# Patient Record
Sex: MALE | Race: OTHER
[De-identification: names, ages, dates, MRNs, and addresses within clinical notes are randomized per-mention and may not be internally consistent; named-entity substitution may affect disease eponyms.]

---

## 2017-04-03 ENCOUNTER — HOSPITAL ENCOUNTER (OUTPATIENT)
Dept: HOSPITAL 8 - OUT | Age: 57
Discharge: HOME | End: 2017-04-03
Attending: SURGERY
Payer: COMMERCIAL

## 2017-04-03 VITALS — SYSTOLIC BLOOD PRESSURE: 152 MMHG | DIASTOLIC BLOOD PRESSURE: 83 MMHG

## 2017-04-03 VITALS — BODY MASS INDEX: 34.41 KG/M2 | HEIGHT: 68 IN | WEIGHT: 227.08 LBS

## 2017-04-03 DIAGNOSIS — I12.0: ICD-10-CM

## 2017-04-03 DIAGNOSIS — N18.6: ICD-10-CM

## 2017-04-03 DIAGNOSIS — E11.22: Primary | ICD-10-CM

## 2017-04-03 DIAGNOSIS — Z99.2: ICD-10-CM

## 2017-04-03 PROCEDURE — 82962 GLUCOSE BLOOD TEST: CPT

## 2017-04-03 PROCEDURE — 85730 THROMBOPLASTIN TIME PARTIAL: CPT

## 2017-04-03 PROCEDURE — 93005 ELECTROCARDIOGRAM TRACING: CPT

## 2017-04-03 PROCEDURE — 36821 AV FUSION DIRECT ANY SITE: CPT

## 2017-04-03 PROCEDURE — 36415 COLL VENOUS BLD VENIPUNCTURE: CPT

## 2017-04-03 PROCEDURE — 85610 PROTHROMBIN TIME: CPT

## 2017-04-03 PROCEDURE — 80047 BASIC METABLC PNL IONIZED CA: CPT

## 2018-01-18 ENCOUNTER — HOSPITAL ENCOUNTER (OUTPATIENT)
Dept: HOSPITAL 8 - OUT | Age: 58
Discharge: HOME | End: 2018-01-18
Attending: INTERNAL MEDICINE
Payer: COMMERCIAL

## 2018-01-18 VITALS — HEIGHT: 68 IN | BODY MASS INDEX: 33.75 KG/M2 | WEIGHT: 222.67 LBS

## 2018-01-18 VITALS — DIASTOLIC BLOOD PRESSURE: 69 MMHG | SYSTOLIC BLOOD PRESSURE: 103 MMHG

## 2018-01-18 DIAGNOSIS — E11.22: ICD-10-CM

## 2018-01-18 DIAGNOSIS — N18.6: ICD-10-CM

## 2018-01-18 DIAGNOSIS — D12.5: ICD-10-CM

## 2018-01-18 DIAGNOSIS — Z12.11: Primary | ICD-10-CM

## 2018-01-18 DIAGNOSIS — I12.0: ICD-10-CM

## 2018-01-18 DIAGNOSIS — D12.4: ICD-10-CM

## 2018-01-18 DIAGNOSIS — Z87.891: ICD-10-CM

## 2018-01-18 DIAGNOSIS — Z99.2: ICD-10-CM

## 2018-01-18 LAB
ALBUMIN SERPL-MCNC: 3.9 G/DL (ref 3.4–5)
ALP SERPL-CCNC: 64 U/L (ref 45–117)
ALT SERPL-CCNC: 17 U/L (ref 12–78)
ANION GAP SERPL CALC-SCNC: 12 MMOL/L (ref 5–15)
BASOPHILS # BLD AUTO: 0.05 X10^3/UL (ref 0–0.1)
BASOPHILS NFR BLD AUTO: 1 % (ref 0–1)
BILIRUB SERPL-MCNC: 0.6 MG/DL (ref 0.2–1)
CALCIUM SERPL-MCNC: 9 MG/DL (ref 8.5–10.1)
CHLORIDE SERPL-SCNC: 96 MMOL/L (ref 98–107)
CREAT SERPL-MCNC: 6.15 MG/DL (ref 0.7–1.3)
EOSINOPHIL # BLD AUTO: 0.52 X10^3/UL (ref 0–0.4)
EOSINOPHIL NFR BLD AUTO: 8 % (ref 1–7)
ERYTHROCYTE [DISTWIDTH] IN BLOOD BY AUTOMATED COUNT: 13.4 % (ref 9.4–14.8)
LYMPHOCYTES # BLD AUTO: 1.17 X10^3/UL (ref 1–3.4)
LYMPHOCYTES NFR BLD AUTO: 17 % (ref 22–44)
MCH RBC QN AUTO: 31.1 PG (ref 27.5–34.5)
MCHC RBC AUTO-ENTMCNC: 34 G/DL (ref 33.2–36.2)
MCV RBC AUTO: 91.7 FL (ref 81–97)
MD: NO
MONOCYTES # BLD AUTO: 0.75 X10^3/UL (ref 0.2–0.8)
MONOCYTES NFR BLD AUTO: 11 % (ref 2–9)
NEUTROPHILS # BLD AUTO: 4.29 X10^3/UL (ref 1.8–6.8)
NEUTROPHILS NFR BLD AUTO: 63 % (ref 42–75)
PLATELET # BLD AUTO: 262 X10^3/UL (ref 130–400)
PMV BLD AUTO: 6.6 FL (ref 7.4–10.4)
PROT SERPL-MCNC: 8 G/DL (ref 6.4–8.2)
RBC # BLD AUTO: 3.75 X10^6/UL (ref 4.38–5.82)

## 2018-01-18 PROCEDURE — 82962 GLUCOSE BLOOD TEST: CPT

## 2018-01-18 PROCEDURE — 93005 ELECTROCARDIOGRAM TRACING: CPT

## 2018-01-18 PROCEDURE — 80053 COMPREHEN METABOLIC PANEL: CPT

## 2018-01-18 PROCEDURE — 88305 TISSUE EXAM BY PATHOLOGIST: CPT

## 2018-01-18 PROCEDURE — 85025 COMPLETE CBC W/AUTO DIFF WBC: CPT

## 2018-01-18 PROCEDURE — 45385 COLONOSCOPY W/LESION REMOVAL: CPT

## 2018-01-18 PROCEDURE — 36415 COLL VENOUS BLD VENIPUNCTURE: CPT

## 2020-07-10 ENCOUNTER — HOSPITAL ENCOUNTER (OUTPATIENT)
Dept: RADIOLOGY | Facility: MEDICAL CENTER | Age: 60
End: 2020-07-10
Attending: NURSE PRACTITIONER
Payer: COMMERCIAL

## 2020-07-10 DIAGNOSIS — N18.6 END STAGE KIDNEY DISEASE (HCC): ICD-10-CM

## 2020-07-10 PROCEDURE — 76775 US EXAM ABDO BACK WALL LIM: CPT

## 2022-06-26 ENCOUNTER — APPOINTMENT (OUTPATIENT)
Dept: RADIOLOGY | Facility: MEDICAL CENTER | Age: 62
End: 2022-06-26
Attending: EMERGENCY MEDICINE
Payer: MEDICARE

## 2022-06-26 ENCOUNTER — HOSPITAL ENCOUNTER (EMERGENCY)
Facility: MEDICAL CENTER | Age: 62
End: 2022-06-26
Attending: EMERGENCY MEDICINE
Payer: MEDICARE

## 2022-06-26 VITALS
RESPIRATION RATE: 17 BRPM | OXYGEN SATURATION: 93 % | HEIGHT: 68 IN | WEIGHT: 240 LBS | HEART RATE: 75 BPM | DIASTOLIC BLOOD PRESSURE: 78 MMHG | BODY MASS INDEX: 36.37 KG/M2 | SYSTOLIC BLOOD PRESSURE: 156 MMHG | TEMPERATURE: 97.2 F

## 2022-06-26 DIAGNOSIS — S00.01XA ABRASION OF SCALP, INITIAL ENCOUNTER: ICD-10-CM

## 2022-06-26 DIAGNOSIS — R55 SYNCOPE, UNSPECIFIED SYNCOPE TYPE: ICD-10-CM

## 2022-06-26 DIAGNOSIS — R79.89 ELEVATED TROPONIN: ICD-10-CM

## 2022-06-26 DIAGNOSIS — J18.9 PNEUMONIA DUE TO INFECTIOUS ORGANISM, UNSPECIFIED LATERALITY, UNSPECIFIED PART OF LUNG: ICD-10-CM

## 2022-06-26 DIAGNOSIS — E87.5 HYPERKALEMIA: ICD-10-CM

## 2022-06-26 LAB
ALBUMIN SERPL BCP-MCNC: 4.6 G/DL (ref 3.2–4.9)
ALBUMIN/GLOB SERPL: 1.5 G/DL
ALP SERPL-CCNC: 70 U/L (ref 30–99)
ALT SERPL-CCNC: 6 U/L (ref 2–50)
ANION GAP SERPL CALC-SCNC: 23 MMOL/L (ref 7–16)
AST SERPL-CCNC: <5 U/L (ref 12–45)
BASOPHILS # BLD AUTO: 0.4 % (ref 0–1.8)
BASOPHILS # BLD: 0.04 K/UL (ref 0–0.12)
BILIRUB SERPL-MCNC: 0.3 MG/DL (ref 0.1–1.5)
BUN SERPL-MCNC: 73 MG/DL (ref 8–22)
CALCIUM SERPL-MCNC: 8.6 MG/DL (ref 8.5–10.5)
CHLORIDE SERPL-SCNC: 95 MMOL/L (ref 96–112)
CO2 SERPL-SCNC: 22 MMOL/L (ref 20–33)
CREAT SERPL-MCNC: 10.46 MG/DL (ref 0.5–1.4)
EKG IMPRESSION: NORMAL
EOSINOPHIL # BLD AUTO: 0.88 K/UL (ref 0–0.51)
EOSINOPHIL NFR BLD: 7.8 % (ref 0–6.9)
ERYTHROCYTE [DISTWIDTH] IN BLOOD BY AUTOMATED COUNT: 52.7 FL (ref 35.9–50)
GFR SERPLBLD CREATININE-BSD FMLA CKD-EPI: 5 ML/MIN/1.73 M 2
GLOBULIN SER CALC-MCNC: 3.1 G/DL (ref 1.9–3.5)
GLUCOSE SERPL-MCNC: 102 MG/DL (ref 65–99)
HCT VFR BLD AUTO: 32.4 % (ref 42–52)
HGB BLD-MCNC: 10.3 G/DL (ref 14–18)
IMM GRANULOCYTES # BLD AUTO: 0.1 K/UL (ref 0–0.11)
IMM GRANULOCYTES NFR BLD AUTO: 0.9 % (ref 0–0.9)
LYMPHOCYTES # BLD AUTO: 1.11 K/UL (ref 1–4.8)
LYMPHOCYTES NFR BLD: 9.8 % (ref 22–41)
MCH RBC QN AUTO: 30.7 PG (ref 27–33)
MCHC RBC AUTO-ENTMCNC: 31.8 G/DL (ref 33.7–35.3)
MCV RBC AUTO: 96.7 FL (ref 81.4–97.8)
MONOCYTES # BLD AUTO: 0.84 K/UL (ref 0–0.85)
MONOCYTES NFR BLD AUTO: 7.4 % (ref 0–13.4)
NEUTROPHILS # BLD AUTO: 8.37 K/UL (ref 1.82–7.42)
NEUTROPHILS NFR BLD: 73.7 % (ref 44–72)
NRBC # BLD AUTO: 0 K/UL
NRBC BLD-RTO: 0 /100 WBC
PLATELET # BLD AUTO: 235 K/UL (ref 164–446)
PMV BLD AUTO: 8.7 FL (ref 9–12.9)
POTASSIUM SERPL-SCNC: 5.9 MMOL/L (ref 3.6–5.5)
PROCALCITONIN SERPL-MCNC: 0.54 NG/ML
PROT SERPL-MCNC: 7.7 G/DL (ref 6–8.2)
RBC # BLD AUTO: 3.35 M/UL (ref 4.7–6.1)
SODIUM SERPL-SCNC: 140 MMOL/L (ref 135–145)
TROPONIN T SERPL-MCNC: 94 NG/L (ref 6–19)
WBC # BLD AUTO: 11.3 K/UL (ref 4.8–10.8)

## 2022-06-26 PROCEDURE — 71045 X-RAY EXAM CHEST 1 VIEW: CPT

## 2022-06-26 PROCEDURE — 87040 BLOOD CULTURE FOR BACTERIA: CPT

## 2022-06-26 PROCEDURE — 99285 EMERGENCY DEPT VISIT HI MDM: CPT

## 2022-06-26 PROCEDURE — 70450 CT HEAD/BRAIN W/O DYE: CPT | Mod: MG

## 2022-06-26 PROCEDURE — 36415 COLL VENOUS BLD VENIPUNCTURE: CPT

## 2022-06-26 PROCEDURE — 700102 HCHG RX REV CODE 250 W/ 637 OVERRIDE(OP): Performed by: EMERGENCY MEDICINE

## 2022-06-26 PROCEDURE — A9270 NON-COVERED ITEM OR SERVICE: HCPCS | Performed by: EMERGENCY MEDICINE

## 2022-06-26 PROCEDURE — 84145 PROCALCITONIN (PCT): CPT

## 2022-06-26 PROCEDURE — 84484 ASSAY OF TROPONIN QUANT: CPT

## 2022-06-26 PROCEDURE — 700111 HCHG RX REV CODE 636 W/ 250 OVERRIDE (IP): Performed by: EMERGENCY MEDICINE

## 2022-06-26 PROCEDURE — 80053 COMPREHEN METABOLIC PANEL: CPT

## 2022-06-26 PROCEDURE — 96365 THER/PROPH/DIAG IV INF INIT: CPT

## 2022-06-26 PROCEDURE — 304217 HCHG IRRIGATION SYSTEM

## 2022-06-26 PROCEDURE — 85025 COMPLETE CBC W/AUTO DIFF WBC: CPT

## 2022-06-26 PROCEDURE — 700101 HCHG RX REV CODE 250: Performed by: EMERGENCY MEDICINE

## 2022-06-26 PROCEDURE — 87150 DNA/RNA AMPLIFIED PROBE: CPT | Mod: 91

## 2022-06-26 PROCEDURE — 700105 HCHG RX REV CODE 258: Performed by: EMERGENCY MEDICINE

## 2022-06-26 PROCEDURE — 93005 ELECTROCARDIOGRAM TRACING: CPT | Performed by: EMERGENCY MEDICINE

## 2022-06-26 PROCEDURE — 87077 CULTURE AEROBIC IDENTIFY: CPT

## 2022-06-26 RX ORDER — AMOXICILLIN AND CLAVULANATE POTASSIUM 500; 125 MG/1; MG/1
1 TABLET, FILM COATED ORAL ONCE
Status: COMPLETED | OUTPATIENT
Start: 2022-06-26 | End: 2022-06-26

## 2022-06-26 RX ORDER — AZITHROMYCIN 500 MG/1
500 INJECTION, POWDER, LYOPHILIZED, FOR SOLUTION INTRAVENOUS ONCE
Status: DISCONTINUED | OUTPATIENT
Start: 2022-06-26 | End: 2022-06-26

## 2022-06-26 RX ORDER — AZITHROMYCIN 250 MG/1
TABLET, FILM COATED ORAL
Qty: 6 TABLET | Refills: 0 | Status: SHIPPED | OUTPATIENT
Start: 2022-06-26

## 2022-06-26 RX ORDER — LIDOCAINE HYDROCHLORIDE AND EPINEPHRINE 10; 10 MG/ML; UG/ML
10 INJECTION, SOLUTION INFILTRATION; PERINEURAL ONCE
Status: DISCONTINUED | OUTPATIENT
Start: 2022-06-26 | End: 2022-06-27 | Stop reason: HOSPADM

## 2022-06-26 RX ORDER — AMOXICILLIN AND CLAVULANATE POTASSIUM 500; 125 MG/1; MG/1
1 TABLET, FILM COATED ORAL DAILY
Qty: 5 TABLET | Refills: 0 | Status: SHIPPED | OUTPATIENT
Start: 2022-06-26 | End: 2022-07-01

## 2022-06-26 RX ORDER — AZITHROMYCIN 500 MG/5ML
500 INJECTION, POWDER, LYOPHILIZED, FOR SOLUTION INTRAVENOUS ONCE
Status: COMPLETED | OUTPATIENT
Start: 2022-06-26 | End: 2022-06-26

## 2022-06-26 RX ORDER — AMOXICILLIN AND CLAVULANATE POTASSIUM 875; 125 MG/1; MG/1
1 TABLET, FILM COATED ORAL ONCE
Status: DISCONTINUED | OUTPATIENT
Start: 2022-06-26 | End: 2022-06-26

## 2022-06-26 RX ADMIN — PATIROMER 8.4 G: 8.4 POWDER, FOR SUSPENSION ORAL at 22:48

## 2022-06-26 RX ADMIN — AMOXICILLIN AND CLAVULANATE POTASSIUM 1 TABLET: 500; 125 TABLET, FILM COATED ORAL at 23:35

## 2022-06-26 RX ADMIN — AZITHROMYCIN MONOHYDRATE 500 MG: 500 INJECTION, POWDER, LYOPHILIZED, FOR SOLUTION INTRAVENOUS at 22:48

## 2022-06-26 ASSESSMENT — ENCOUNTER SYMPTOMS
LOSS OF CONSCIOUSNESS: 1
FALLS: 1
DIZZINESS: 0

## 2022-06-27 ASSESSMENT — ENCOUNTER SYMPTOMS
FEVER: 0
CHILLS: 0

## 2022-06-27 NOTE — ED PROVIDER NOTES
ED Provider Note    Scribed for Renetta Arellano M.D. by She Hermosillo. 6/26/2022, 8:12 PM.    Primary care provider: Pcp Not In Computer  Means of arrival: EMS  History obtained from: Patient  History limited by: None    CHIEF COMPLAINT  Chief Complaint   Patient presents with   • Syncope     EMS reports syncopal episode today while at home. Pt sustained laceration to right posterior scalp. Currently AAOx4 at time or triage     HPI  Gerson Raymundo is a 61 y.o. male who presents to the Emergency Department via EMS to bedside following a syncopal episode earlier today. There was positive head strike, and the patient arrives with dried blood down his neck from the posterior scalp secondary to a sustained head abrasion. Patient last recalls leaning on the counter watching television and then remembers waking up on the floor. He notes that he has been experiencing syncopal episodes once a week for about 6 months now, but has never had a head strike like this before. Tomorrow he has an appointment with his PCP where he was planning on discussing the syncopal episodes with his doctor. He denies experiencing any dizziness or chest pains before the syncopal episode. He is not on any blood thinners daily.     The patient has a notable history of Renal failure in which he undergoes Dialysis on M/W/F. He has not missed any recent Dialysis infusions. Patient's daughter is at bedside and relays that the patient was at his hospitalized in March 2022 and he was found to have hypotension and low pulse ox.  He was hospitalized at [facility].      REVIEW OF SYSTEMS  Review of Systems   Constitutional: Negative for chills and fever.   HENT:        Positive for head strike. Laceration to the posterior scalp   Cardiovascular: Negative for chest pain.   Musculoskeletal: Positive for falls.   Neurological: Positive for loss of consciousness. Negative for dizziness.   All other systems reviewed and are  "negative.      PAST MEDICAL HISTORY   has a past medical history of Back pain, Diabetes (HCC), ESRD (end stage renal disease) on dialysis (HCC), and Hypertension.    SURGICAL HISTORY  patient denies any surgical history    SOCIAL HISTORY  Social History     Tobacco Use   • Smoking status: Former Smoker   • Smokeless tobacco: Never Used   Substance Use Topics   • Alcohol use: Not Currently   • Drug use: Never      Social History     Substance and Sexual Activity   Drug Use Never       FAMILY HISTORY  History reviewed. No pertinent family history.    CURRENT MEDICATIONS  Home Medications     Reviewed by Rey Bingham R.N. (Registered Nurse) on 06/26/22 at 2006  Med List Status: Not Addressed   Medication Last Dose Status   amoxicillin-clavulanate (AUGMENTIN) 875-125 MG Tab  Active              ALLERGIES  No Known Allergies    PHYSICAL EXAM  VITAL SIGNS: BP (!) 172/72   Pulse 71   Temp 36.4 °C (97.5 °F) (Temporal)   Resp 18   Ht 1.727 m (5' 8\")   Wt 109 kg (240 lb)   SpO2 94%   BMI 36.49 kg/m²     Vitals reviewed by myself.  Nursing note and vitals reviewed.  Constitutional: Well-developed and well-nourished. No distress.   HENT: Head is normocephalic, abrasion to posterior scalp. oropharynx is clear and moist without exudate or erythema.   Eyes: Pupils are equal, round, and reactive to light. No horizontal or vertical nystagmus. Conjunctiva are normal.   Cardiovascular: Normal rate and regular rhythm. No murmur heard. Normal radial pulses.  Pulmonary/Chest: Breath sounds normal. No wheezes or rales.   Abdominal: Soft and non-tender. No distention.    Musculoskeletal: Extremities exhibit normal range of motion without edema or tenderness. Patient ambulates with a normal narrow-based steady gait.  Left arm AV fistula is present  Neurological: Awake, alert and oriented to person, place, and time. No focal deficits noted. Cranial nerves II - XII intact. No pronator drift.  No dysmetria on cerebellar testing. " "Normal speech and language. Normal strength and sensation in bilateral upper and lower extremities.   Skin: Skin is warm and dry. No rash.   Psychiatric: Normal mood and affect. Appropriate for clinical situation.    DIAGNOSTIC STUDIES /  LABS  Labs Reviewed   CBC WITH DIFFERENTIAL - Abnormal; Notable for the following components:       Result Value    WBC 11.3 (*)     RBC 3.35 (*)     Hemoglobin 10.3 (*)     Hematocrit 32.4 (*)     MCHC 31.8 (*)     RDW 52.7 (*)     MPV 8.7 (*)     Neutrophils-Polys 73.70 (*)     Lymphocytes 9.80 (*)     Eosinophils 7.80 (*)     Neutrophils (Absolute) 8.37 (*)     Eos (Absolute) 0.88 (*)     All other components within normal limits   COMP METABOLIC PANEL - Abnormal; Notable for the following components:    Potassium 5.9 (*)     Chloride 95 (*)     Anion Gap 23.0 (*)     Glucose 102 (*)     Bun 73 (*)     Creatinine 10.46 (*)     AST(SGOT) <5 (*)     All other components within normal limits   TROPONIN - Abnormal; Notable for the following components:    Troponin T 94 (*)     All other components within normal limits   PROCALCITONIN - Abnormal; Notable for the following components:    Procalcitonin 0.54 (*)     All other components within normal limits   ESTIMATED GFR - Abnormal; Notable for the following components:    GFR (CKD-EPI) 5 (*)     All other components within normal limits   BLOOD CULTURE    Narrative:     1 of 2 for Blood Culture x 2 sites order. Per Hospital  Policy: Only change Specimen Src: to \"Line\" if specified by  physician order.   BLOOD CULTURE     All labs reviewed by me.    EKG Interpretation:  Interpreted by myself    12 Lead EKG interpreted by me to show:  EKG at 8:10 PM: Normal sinus rhythm, heart rate 68, normal axis, normal intervals, , , QTc 460, no acute ST-T segment changes, no evidence of acute arrhythmia or ischemia  My impression of this EKG: Does not indicate ischemia or arrhythmia at this time.    RADIOLOGY  CT-HEAD W/O   Final " Result         1.  No acute intracranial abnormality is identified, there are nonspecific white matter changes, commonly associated with small vessel ischemic disease.  Associated mild cerebral atrophy is noted.   2.  Atherosclerosis.         DX-CHEST-PORTABLE (1 VIEW)   Final Result         1.  Left midlung and lower lobe linear density suggests atelectasis or scarring        The radiologist's interpretation of all radiological studies have been reviewed by me.    REASSESSMENT  8:30 PM- Patient seen and examined at bedside. Discussed plan for laceration repair procedure and labs and imaging. Patient verbalizes understanding and agreement to this plan of care.     9:49 PM- Patient was reevaluated at bedside. Discussed lab and radiology results with the patient and informed them that he should be hospitalized due to critical labs. Patient will discuss with daughter before deciding.     9:57 PM I discussed the patient's case and the above findings with Dr. Bolden for Dr. Nazario (Nephrology) who is aware of the patient's case and findings.    10:52 PM - Patient reevaluated at bedside.     COURSE & MEDICAL DECISION MAKING  Nursing notes, VS, PMSFHx reviewed in chart.    Patient is a 61-year-old male who comes in for evaluation of syncope.  Differential diagnosis includes vasovagal syncope, arrhythmia, electrolyte disturbance, dehydration, infection, closed head injury, intracranial hemorrhage.  Diagnostic work-up includes labs, chest x-ray and CT of the head.    Patient's initial vitals are notable for hypertension, he is neurologically intact on exam.  EKG returns and demonstrates no evidence of acute arrhythmia or ischemia.  CT of the head demonstrates no acute intracranial processes.  Chest x-ray demonstrates a left midlung and lower lobe density, this may be atelectasis versus infection, therefore I will also check a procalcitonin.  I spoke with patient regarding this and he reports that he did have pneumonia  earlier in the year and reports that his symptoms never seem to resolve.  He has had a progressively worsening cough over the last month as well.  Procalcitonin and white count returned and are elevated therefore patient is treated with antibiotics (Augmentin and azithromycin).  Patient's labs returned and are consistent with end-stage renal disease however potassium is mildly elevated, he is due to go to dialysis in the morning.  EKG demonstrates no changes concerning for hyperkalemia.  I discussed the case with on-call nephrologist Dr. Mao who recommends a dose of Veltassa tonight with planned dialysis tomorrow.  Patient's troponin returns and is 94.  EKG is nonischemic and he is not having any chest pain at this time.  However given his syncope I am concerned about possible underlying cardiac pathology.  His troponin may be related to his end-stage renal disease but we have no baseline here for comparison.      At this time I advised patient we should hospitalize him for further syncope work-up however given insurance reasons he reports that he cannot be admitted to this hospital given concern of high cost.  He did initially ask EMS to take him to [facility] but he was diverted secondary to the hospital being on ambulance divert.  Patient reports that he has follow-up with his doctor at [facility] tomorrow morning after dialysis and reports that he feels well at this time.  He would like to be discharged at this time given concern for cost issues.  I advised him of concern for potential deterioration overnight and he understands and knows that he needs to be reassessed at any time for worsening symptoms.  He is then provided with prescriptions for Augmentin (renally dosed) and azithromycin for ongoing management of pneumonia.  He is advised he will need further management of his syncopal episodes.  He has been given strict return precautions and discharged in stable condition.    The patient will  return for new or worsening symptoms and is stable at the time of discharge.    The patient is referred to a primary physician for blood pressure management, diabetic screening, and for all other preventative health concerns.    DISPOSITION:  Patient will be discharged home in stable condition.    FOLLOW UP:  Renetta Greenfield M.D.  [address] [facility] [address] [place] [address]  [phone]            OUTPATIENT MEDICATIONS:  New Prescriptions    AMOXICILLIN-CLAVULANATE (AUGMENTIN) 500-125 MG TAB    Take 1 Tablet by mouth every day for 5 days.    AZITHROMYCIN (ZITHROMAX) 250 MG TAB    Take two tabs by mouth on day one, then one tab by mouth daily on days 2-5.     FINAL IMPRESSION  1. Syncope, unspecified syncope type    2. Pneumonia due to infectious organism, unspecified laterality, unspecified part of lung    3. Abrasion of scalp, initial encounter    4. Hyperkalemia    5. Elevated troponin          I, She Hermosillo (Scribe), am scribing for, and in the presence of, Renetta Arellano M.D..    Electronically signed by: She Hermosillo (Scribaugusto), 6/26/2022    IRenetta M.D. personally performed the services described in this documentation, as scribed by She Hermosillo in my presence, and it is both accurate and complete.    The note accurately reflects work and decisions made by me.  Renetta Arellano M.D.  6/27/2022  12:27 AM

## 2022-06-27 NOTE — ED NOTES
PIV removed, catheter intact. Discharge education provided. Discharge paperwork signed by pt. Prescription to be picked up by pt. All questions answered. All belongings with pt. Pt ambulated to lobby unassisted with steady gait.

## 2022-06-27 NOTE — ED TRIAGE NOTES
Chief Complaint   Patient presents with   • Syncope     EMS reports syncopal episode today while at home. Pt sustained laceration to right posterior scalp. Currently AAOx4 at time or triage     Pt denies blood thinners

## 2022-06-29 LAB
BACTERIA BLD CULT: ABNORMAL
SIGNIFICANT IND 70042: ABNORMAL
SITE SITE: ABNORMAL
SOURCE SOURCE: ABNORMAL

## 2022-07-02 LAB
BACTERIA BLD CULT: NORMAL
SIGNIFICANT IND 70042: NORMAL
SITE SITE: NORMAL
SOURCE SOURCE: NORMAL

## 2022-11-02 ENCOUNTER — PATIENT MESSAGE (OUTPATIENT)
Dept: HEALTH INFORMATION MANAGEMENT | Facility: OTHER | Age: 62
End: 2022-11-02

## 2023-05-29 ENCOUNTER — APPOINTMENT (OUTPATIENT)
Dept: RADIOLOGY | Facility: MEDICAL CENTER | Age: 63
End: 2023-05-29
Attending: EMERGENCY MEDICINE
Payer: MEDICARE

## 2023-05-29 ENCOUNTER — HOSPITAL ENCOUNTER (EMERGENCY)
Facility: MEDICAL CENTER | Age: 63
End: 2023-05-29
Attending: EMERGENCY MEDICINE
Payer: MEDICARE

## 2023-05-29 VITALS
TEMPERATURE: 98 F | RESPIRATION RATE: 16 BRPM | HEIGHT: 68 IN | OXYGEN SATURATION: 95 % | HEART RATE: 98 BPM | SYSTOLIC BLOOD PRESSURE: 190 MMHG | DIASTOLIC BLOOD PRESSURE: 81 MMHG | BODY MASS INDEX: 37.89 KG/M2 | WEIGHT: 250 LBS

## 2023-05-29 DIAGNOSIS — N18.6 ESRD (END STAGE RENAL DISEASE) ON DIALYSIS (HCC): ICD-10-CM

## 2023-05-29 DIAGNOSIS — S09.90XA CLOSED HEAD INJURY, INITIAL ENCOUNTER: ICD-10-CM

## 2023-05-29 DIAGNOSIS — S01.01XA LACERATION OF SCALP, INITIAL ENCOUNTER: ICD-10-CM

## 2023-05-29 DIAGNOSIS — S80.01XA CONTUSION OF RIGHT KNEE, INITIAL ENCOUNTER: ICD-10-CM

## 2023-05-29 DIAGNOSIS — Z99.2 ESRD (END STAGE RENAL DISEASE) ON DIALYSIS (HCC): ICD-10-CM

## 2023-05-29 DIAGNOSIS — E87.5 HYPERKALEMIA: ICD-10-CM

## 2023-05-29 LAB
ALBUMIN SERPL BCP-MCNC: 4.1 G/DL (ref 3.2–4.9)
ALBUMIN/GLOB SERPL: 1.4 G/DL
ALP SERPL-CCNC: 79 U/L (ref 30–99)
ALT SERPL-CCNC: 10 U/L (ref 2–50)
ANION GAP SERPL CALC-SCNC: 20 MMOL/L (ref 7–16)
AST SERPL-CCNC: 9 U/L (ref 12–45)
BASOPHILS # BLD AUTO: 0.6 % (ref 0–1.8)
BASOPHILS # BLD: 0.06 K/UL (ref 0–0.12)
BILIRUB SERPL-MCNC: 0.5 MG/DL (ref 0.1–1.5)
BUN SERPL-MCNC: 74 MG/DL (ref 8–22)
CALCIUM ALBUM COR SERPL-MCNC: 8.5 MG/DL (ref 8.5–10.5)
CALCIUM SERPL-MCNC: 8.6 MG/DL (ref 8.5–10.5)
CHLORIDE SERPL-SCNC: 96 MMOL/L (ref 96–112)
CO2 SERPL-SCNC: 20 MMOL/L (ref 20–33)
CREAT SERPL-MCNC: 10.92 MG/DL (ref 0.5–1.4)
EKG IMPRESSION: NORMAL
EOSINOPHIL # BLD AUTO: 0.44 K/UL (ref 0–0.51)
EOSINOPHIL NFR BLD: 4.1 % (ref 0–6.9)
ERYTHROCYTE [DISTWIDTH] IN BLOOD BY AUTOMATED COUNT: 55.8 FL (ref 35.9–50)
GFR SERPLBLD CREATININE-BSD FMLA CKD-EPI: 5 ML/MIN/1.73 M 2
GLOBULIN SER CALC-MCNC: 3 G/DL (ref 1.9–3.5)
GLUCOSE SERPL-MCNC: 141 MG/DL (ref 65–99)
HCT VFR BLD AUTO: 34.4 % (ref 42–52)
HGB BLD-MCNC: 11 G/DL (ref 14–18)
IMM GRANULOCYTES # BLD AUTO: 0.05 K/UL (ref 0–0.11)
IMM GRANULOCYTES NFR BLD AUTO: 0.5 % (ref 0–0.9)
LYMPHOCYTES # BLD AUTO: 1.14 K/UL (ref 1–4.8)
LYMPHOCYTES NFR BLD: 10.5 % (ref 22–41)
MCH RBC QN AUTO: 30.5 PG (ref 27–33)
MCHC RBC AUTO-ENTMCNC: 32 G/DL (ref 32.3–36.5)
MCV RBC AUTO: 95.3 FL (ref 81.4–97.8)
MONOCYTES # BLD AUTO: 0.78 K/UL (ref 0–0.85)
MONOCYTES NFR BLD AUTO: 7.2 % (ref 0–13.4)
NEUTROPHILS # BLD AUTO: 8.37 K/UL (ref 1.82–7.42)
NEUTROPHILS NFR BLD: 77.1 % (ref 44–72)
NRBC # BLD AUTO: 0 K/UL
NRBC BLD-RTO: 0 /100 WBC (ref 0–0.2)
PLATELET # BLD AUTO: 179 K/UL (ref 164–446)
PMV BLD AUTO: 9.2 FL (ref 9–12.9)
POTASSIUM SERPL-SCNC: 6 MMOL/L (ref 3.6–5.5)
PROT SERPL-MCNC: 7.1 G/DL (ref 6–8.2)
RBC # BLD AUTO: 3.61 M/UL (ref 4.7–6.1)
SODIUM SERPL-SCNC: 136 MMOL/L (ref 135–145)
WBC # BLD AUTO: 10.8 K/UL (ref 4.8–10.8)

## 2023-05-29 PROCEDURE — 700101 HCHG RX REV CODE 250: Performed by: EMERGENCY MEDICINE

## 2023-05-29 PROCEDURE — 93005 ELECTROCARDIOGRAM TRACING: CPT | Performed by: EMERGENCY MEDICINE

## 2023-05-29 PROCEDURE — 70450 CT HEAD/BRAIN W/O DYE: CPT

## 2023-05-29 PROCEDURE — 304217 HCHG IRRIGATION SYSTEM

## 2023-05-29 PROCEDURE — 96374 THER/PROPH/DIAG INJ IV PUSH: CPT | Mod: XU

## 2023-05-29 PROCEDURE — 90471 IMMUNIZATION ADMIN: CPT

## 2023-05-29 PROCEDURE — A9270 NON-COVERED ITEM OR SERVICE: HCPCS | Performed by: EMERGENCY MEDICINE

## 2023-05-29 PROCEDURE — 36415 COLL VENOUS BLD VENIPUNCTURE: CPT

## 2023-05-29 PROCEDURE — 303747 HCHG EXTRA SUTURE

## 2023-05-29 PROCEDURE — 73562 X-RAY EXAM OF KNEE 3: CPT | Mod: RT

## 2023-05-29 PROCEDURE — 85025 COMPLETE CBC W/AUTO DIFF WBC: CPT

## 2023-05-29 PROCEDURE — 80053 COMPREHEN METABOLIC PANEL: CPT

## 2023-05-29 PROCEDURE — 90715 TDAP VACCINE 7 YRS/> IM: CPT | Performed by: EMERGENCY MEDICINE

## 2023-05-29 PROCEDURE — 304999 HCHG REPAIR-SIMPLE/INTERMED LEVEL 1

## 2023-05-29 PROCEDURE — 99285 EMERGENCY DEPT VISIT HI MDM: CPT

## 2023-05-29 PROCEDURE — 700111 HCHG RX REV CODE 636 W/ 250 OVERRIDE (IP): Performed by: EMERGENCY MEDICINE

## 2023-05-29 PROCEDURE — 700102 HCHG RX REV CODE 250 W/ 637 OVERRIDE(OP): Performed by: EMERGENCY MEDICINE

## 2023-05-29 RX ADMIN — CLOSTRIDIUM TETANI TOXOID ANTIGEN (FORMALDEHYDE INACTIVATED), CORYNEBACTERIUM DIPHTHERIAE TOXOID ANTIGEN (FORMALDEHYDE INACTIVATED), BORDETELLA PERTUSSIS TOXOID ANTIGEN (GLUTARALDEHYDE INACTIVATED), BORDETELLA PERTUSSIS FILAMENTOUS HEMAGGLUTININ ANTIGEN (FORMALDEHYDE INACTIVATED), BORDETELLA PERTUSSIS PERTACTIN ANTIGEN, AND BORDETELLA PERTUSSIS FIMBRIAE 2/3 ANTIGEN 0.5 ML: 5; 2; 2.5; 5; 3; 5 INJECTION, SUSPENSION INTRAMUSCULAR at 15:05

## 2023-05-29 RX ADMIN — PATIROMER 8.4 G: 8.4 POWDER, FOR SUSPENSION ORAL at 19:02

## 2023-05-29 RX ADMIN — LIDOCAINE HYDROCHLORIDE 20 ML: 10; .005 INJECTION, SOLUTION EPIDURAL; INFILTRATION; INTRACAUDAL; PERINEURAL at 15:15

## 2023-05-29 RX ADMIN — MORPHINE SULFATE 8 MG: 10 INJECTION INTRAVENOUS at 15:08

## 2023-05-29 ASSESSMENT — FIBROSIS 4 INDEX: FIB4 SCORE: 0.5

## 2023-05-29 ASSESSMENT — LIFESTYLE VARIABLES: DO YOU DRINK ALCOHOL: NO

## 2023-05-29 NOTE — ED PROVIDER NOTES
ED Provider Note    CHIEF COMPLAINT  Chief Complaint   Patient presents with    T-5000 Head Injury     Pt fell asleep and fell forward hitting wall    Head Laceration     Forehead approx 2 in    Knee Pain     R knee           HPI/ROS    OUTSIDE HISTORIAN(S):  Family Zonalon stating the patient did not lose consciousness, has been alert and oriented since the fall.    Gerson Raymundo is a 62 y.o. male who presents this is a 62-year-old male who presents with fall into a wall hitting his head and right knee.  The patient states he fall asleep frequently and today fell and hit his head.  He woke when he fell.  Denies loss of conscious, loss of sensation or strength arms legs, nausea, vomiting complains of severe headache.  Patient had a big cut on his head where pressure dressing.  Edition is complaining of right knee pain as he hit his knee against the wall.  He has been able to ambulate secondary to his pain.  He is a dialysis patient and states he is a Monday Wednesday Friday dialysis patient and states that he missed today and will be going tomorrow.  He states his potassium runs high.  Is unsure his last tetanus booster.    PAST MEDICAL HISTORY   has a past medical history of Back pain, Diabetes (HCC), ESRD (end stage renal disease) on dialysis ([facility]), and Hypertension.    SURGICAL HISTORY  patient denies any surgical history    FAMILY HISTORY  History reviewed. No pertinent family history.    SOCIAL HISTORY  Social History     Tobacco Use    Smoking status: Former    Smokeless tobacco: Never   Substance and Sexual Activity    Alcohol use: Not Currently    Drug use: Never    Sexual activity: Not on file       CURRENT MEDICATIONS  Home Medications       Reviewed by Aubrie Urias R.N. (Registered Nurse) on 05/29/23 at 1427  Med List Status: Not Addressed     Medication Last Dose Status   azithromycin (ZITHROMAX) 250 MG Tab  Active                    ALLERGIES  No Known Allergies    PHYSICAL EXAM  VITAL SIGNS: BP (!)  "151/78   Pulse 100   Temp 36.8 °C (98.3 °F)   Resp 18   Ht 1.727 m (5' 8\")   Wt 113 kg (250 lb)   SpO2 95%   BMI 38.01 kg/m²      Nursing notes and vitals reviewed.  Constitutional: Well developed, Well nourished, No acute distress, Non-toxic appearance.   Eyes: PERRLA, EOMI, Conjunctiva normal, No discharge.   HENT: 2.5 cm laceration to left anterior forehead with arterial component with squirting blood, no bony tenderness  Neck: No cervical spine tenderness or step-off deformity   Cardiovascular: Normal heart rate, Normal rhythm, No murmurs, No rubs, No gallops.   Thorax & Lungs: No respiratory distress, No rales, No rhonchi, No wheezing, No chest tenderness.   Abdomen: Bowel sounds normal, Soft, No tenderness, No guarding, No rebound, No masses, No pulsatile masses.   Extremities: No deformity, no edema, good range of motion range of motion upper lower extremes bilaterally, fistula left upper extremity positive thrill  Neurologic: Alert & oriented x 3, no focal abnormalities noted, acting appropriately on examination  Psychiatric: Affect normal for clinical presentation.      DIAGNOSTIC STUDIES / PROCEDURES  EKG  I have independently interpreted this EKG  Jean Marie rhythm on monitor    Laceration Repair Procedure Note    Indication: Laceration    Procedure: The patient was placed in the appropriate position and anesthesia around the laceration was obtained by infiltration using 1% Lidocaine with epinephrine. The area was then irrigated with normal saline. The laceration was closed in two layers. The subcutaneous layer was closed with 3-0 Vicryl using interrupted sutures. The skin was closed with 5-0 Ethilon using interrupted sutures. The wound area was then dressed with bacitracin.      Total repaired wound length: 2.5 cm.     Other Items: Suture count: 11 (5 figure-of-eight Vicryl sutures subcutaneous for arterial hemorrhage, 6 Ethilon skin sutures,    The patient tolerated the procedure " well.    Complications: None      COURSE & MEDICAL DECISION MAKING    ED Observation Status? Yes; I am placing the patient in to an observation status due to a diagnostic uncertainty as well as therapeutic intensity. Patient placed in observation status at 3:43 PM, 5/29/2023.     Observation plan is as follows: Suture repair, blood work, CT scan of the brain, x-ray of knee      INITIAL ASSESSMENT, COURSE AND PLAN  Care Narrative: This is a 62-year-old gentleman status post fall.  He is fell asleep and denies loss of consciousness states this happens frequently.  He has no neurological vascular deficits.  The patient have a significant arterial hemorrhage in the left anterior forehead therefore emergently closed with 5 figure-of-eight sutures followed by skin closure.  The patient tolerated procedure well.  Tetanus booster was given and morphine 8 mg IM was given as well.  The patient will require CT scan of the head as well as x-ray.  Discussed the patient my partner, Dr. Underwood, who graciously excepts this patient will follow-up in the care of this patient have definitive care.      ADDITIONAL PROBLEM LIST  Hemodialysis patient: The patient will require aluation for possible hyperkalemia.    DISPOSITION AND DISCUSSIONS    FINAL DIAGNOSIS  Facial laceration  Closed head injury  Right knee contusion       Electronically signed by: Rey Momin D.O., 5/29/2023 3:38 PM

## 2023-05-29 NOTE — ED TRIAGE NOTES
"Chief Complaint   Patient presents with    T-5000 Head Injury     Pt fell asleep and fell forward hitting wall    Head Laceration     Forehead approx 2 in    Knee Pain     R knee         Pt BIBA from home after \"falling asleep\" while standing and falling forward. Pt hit head on brick fireplace resulting in 2 in laceration to forehead.  Pt wakes up after incident. Pt reports he has history of falling asleep during the day and has been assessed for this. Pt is AOx4, GCS 15.  Denies thinners.  R knee deformity noted as well.  Pt able to stand on knee.     Blood Pressure: (!) 151/78, Pulse: 100, Respiration: 18, Temperature: 36.8 °C (98.3 °F), Height: 172.7 cm (5' 8\"), Weight: 113 kg (250 lb), BMI (Calculated): 38.01, BSA (Calculated): 2.3, Pulse Oximetry: 92 %, O2 (LPM): 0, O2 Delivery Device: None - Room Air    "

## 2023-05-30 NOTE — DISCHARGE SUMMARY
ED Observation Discharge Summary    Patient:Gerson Raymundo  Patient : 1960  Patient MRN: [id number]  Patient PCP: Renetta Greenfield M.D.    Admit Date: 2023  Discharge Date and Time: 23 5:28 PM  Discharge Diagnosis:   1. Closed head injury, initial encounter        2. Laceration of scalp, initial encounter        3. Contusion of right knee, initial encounter        4. ESRD (end stage renal disease) on dialysis ([facility])        5. Hyperkalemia           Discharge Attending: Giovanny Underwood M.D.  Discharge Service: ED Observation    ED Course  Gerson is a 62 y.o. male who was evaluated at [facility] because he fell and hit his head.  The patient had a large laceration that was repaired by Dr. Preston and he ordered for CT scan laboratory studies as well as an x-ray of the right knee.  CT scan of the head shows no signs of intercerebral injuries his only injury now is the scalp laceration.  The patient from the standpoint of the scalp laceration will be given head injury instructions recommend return as needed however he is to have the sutures removed in 7 to 10 days by their his primary care physician or here at this facility.    Secondary problem the patient is an end-stage renal disease with dialysis he missed his last dialysis he is scheduled tomorrow morning for dialysis so laboratory studies were drawn which showed hyperkalemia at 6.  His most recent potassium was at 5.9 which could be as very line baseline.  EKG does show signs of atrial flutter but then goes back in a sinus rhythm on EKG monitoring in the room.  I did speak with nephrology  and they recommended starting the patient on a dose of patiromer and he feels that the patient is safe for discharge to get his dialysis in the morning.  At this point the patient is otherwise stable hemodynamically and shows no other signs of significant ectopy on EKG monitoring at bedside and I do feel the patient is stable  "for discharge.    Discharge Exam:  BP (!) 151/78   Pulse 100   Temp 36.8 °C (98.3 °F)   Resp 18   Ht 1.727 m (5' 8\")   Wt 113 kg (250 lb)   SpO2 95%   BMI 38.01 kg/m² .    Constitutional: Awake and alert. Nontoxic  HENT: There is suture repair on the left lateral scalp  Eyes: Grossly normal  Neck: Normal range of motion  Cardiovascular: Normal heart rate   Thorax & Lungs: No respiratory distress  Abdomen: Nontender  Skin:  No pathologic rash.   Extremities: Patient does have some abrasions to both knees he does have good range of motion of the right knee.    Psychiatric: Affect normal    Labs  Results for orders placed or performed during the hospital encounter of 05/29/23   CBC WITH DIFFERENTIAL   Result Value Ref Range    WBC 10.8 4.8 - 10.8 K/uL    RBC 3.61 (L) 4.70 - 6.10 M/uL    Hemoglobin 11.0 (L) 14.0 - 18.0 g/dL    Hematocrit 34.4 (L) 42.0 - 52.0 %    MCV 95.3 81.4 - 97.8 fL    MCH 30.5 27.0 - 33.0 pg    MCHC 32.0 (L) 32.3 - 36.5 g/dL    RDW 55.8 (H) 35.9 - 50.0 fL    Platelet Count 179 164 - 446 K/uL    MPV 9.2 9.0 - 12.9 fL    Neutrophils-Polys 77.10 (H) 44.00 - 72.00 %    Lymphocytes 10.50 (L) 22.00 - 41.00 %    Monocytes 7.20 0.00 - 13.40 %    Eosinophils 4.10 0.00 - 6.90 %    Basophils 0.60 0.00 - 1.80 %    Immature Granulocytes 0.50 0.00 - 0.90 %    Nucleated RBC 0.00 0.00 - 0.20 /100 WBC    Neutrophils (Absolute) 8.37 (H) 1.82 - 7.42 K/uL    Lymphs (Absolute) 1.14 1.00 - 4.80 K/uL    Monos (Absolute) 0.78 0.00 - 0.85 K/uL    Eos (Absolute) 0.44 0.00 - 0.51 K/uL    Baso (Absolute) 0.06 0.00 - 0.12 K/uL    Immature Granulocytes (abs) 0.05 0.00 - 0.11 K/uL    NRBC (Absolute) 0.00 K/uL   CMP   Result Value Ref Range    Sodium 136 135 - 145 mmol/L    Potassium 6.0 (H) 3.6 - 5.5 mmol/L    Chloride 96 96 - 112 mmol/L    Co2 20 20 - 33 mmol/L    Anion Gap 20.0 (H) 7.0 - 16.0    Glucose 141 (H) 65 - 99 mg/dL    Bun 74 (H) 8 - 22 mg/dL    Creatinine 10.92 (HH) 0.50 - 1.40 mg/dL    Calcium 8.6 8.5 - 10.5 " mg/dL    AST(SGOT) 9 (L) 12 - 45 U/L    ALT(SGPT) 10 2 - 50 U/L    Alkaline Phosphatase 79 30 - 99 U/L    Total Bilirubin 0.5 0.1 - 1.5 mg/dL    Albumin 4.1 3.2 - 4.9 g/dL    Total Protein 7.1 6.0 - 8.2 g/dL    Globulin 3.0 1.9 - 3.5 g/dL    A-G Ratio 1.4 g/dL   CORRECTED CALCIUM   Result Value Ref Range    Correct Calcium 8.5 8.5 - 10.5 mg/dL   ESTIMATED GFR   Result Value Ref Range    GFR (CKD-EPI) 5 (A) >60 mL/min/1.73 m 2   EKG   Result Value Ref Range    Report       [facility] Emergency Dept.    Test Date:  2023  Pt Name:    WENDY GRAY               Department: ER  MRN:        [id number]                      Room:       [facility]  Gender:     Male                         Technician: [id number]  :        1960                   Requested By:SACHIN SEXTON  Order #:    [id number]                    Reading MD: SACHIN SEXTON MD    Measurements  Intervals                                Axis  Rate:       75                           P:  OK:                                      QRS:        115  QRSD:       100                          T:          44  QT:         421  QTc:        471    Interpretive Statements  Atrial flutter with predominant 3:1 AV block  Left posterior fascicular block  Minimal ST depression, inferior leads  Compared to ECG 2022 20:10:28  AV block, advanced (high-grade) now present  Left posterior fascicular block now present  ST (T wave) deviation now present  Sinus rhythm no longer present  Electr onically Signed On 2023 17:01:10 PDT by SACHIN SEXTON MD         Radiology  CT-HEAD W/O   Final Result         NO ACUTE ABNORMALITIES ARE NOTED ON CT SCAN OF THE HEAD.               DX-KNEE 3 VIEWS RIGHT   Final Result      1.  No evidence of fracture or dislocation.      2.  Soft tissue swelling anterior to patella.      3.  Mild osteoarthritis.          Medications:   New Prescriptions    No medications on file       My final assessment includes   1.  Closed head injury, initial encounter        2. Laceration of scalp, initial encounter        3. Contusion of right knee, initial encounter        4. ESRD (end stage renal disease) on dialysis ([facility])        5. Hyperkalemia            Upon Reevaluation, the patient's condition has: Improved; and will be discharged.    Patient discharged from ED Observation status at 5:31 PM 05/29/23      Total time spent on this ED Observation discharge encounter is > 30 Minutes    Electronically signed by: Giovanny Underwood M.D., 5/29/2023 5:28 PM

## 2023-05-30 NOTE — ED NOTES
Patient discharged home per ERP.  Discharge teaching and education discussed with patient. POC discussed.   Patient verbalized understanding of discharge teaching and education. No other questions at this time.       VSS. Patient alert and oriented. Patient arranged ride for self. Able to ambulate off unit safely with steady gait.

## 2023-05-30 NOTE — ED NOTES
Pt able to ambulate with steady gait unassisted. Pt has no complaints of dizziness or lightheadedness. Primary RN aware.